# Patient Record
Sex: FEMALE | Race: WHITE | Employment: FULL TIME | URBAN - METROPOLITAN AREA
[De-identification: names, ages, dates, MRNs, and addresses within clinical notes are randomized per-mention and may not be internally consistent; named-entity substitution may affect disease eponyms.]

---
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
## 2020-01-14 ENCOUNTER — OFFICE VISIT (OUTPATIENT)
Dept: OBGYN | Facility: CLINIC | Age: 20
End: 2020-01-14
Payer: COMMERCIAL

## 2020-01-14 VITALS
HEART RATE: 94 BPM | OXYGEN SATURATION: 98 % | DIASTOLIC BLOOD PRESSURE: 69 MMHG | BODY MASS INDEX: 21.35 KG/M2 | SYSTOLIC BLOOD PRESSURE: 111 MMHG | WEIGHT: 113 LBS

## 2020-01-14 DIAGNOSIS — Z11.3 SCREENING EXAMINATION FOR STD (SEXUALLY TRANSMITTED DISEASE): ICD-10-CM

## 2020-01-14 DIAGNOSIS — Z01.419 ENCOUNTER FOR GYNECOLOGICAL EXAMINATION WITHOUT ABNORMAL FINDING: Primary | ICD-10-CM

## 2020-01-14 DIAGNOSIS — N89.8 VAGINAL DISCHARGE: ICD-10-CM

## 2020-01-14 LAB
SPECIMEN SOURCE: NORMAL
WET PREP SPEC: NORMAL

## 2020-01-14 PROCEDURE — 99395 PREV VISIT EST AGE 18-39: CPT | Performed by: OBSTETRICS & GYNECOLOGY

## 2020-01-14 PROCEDURE — 87210 SMEAR WET MOUNT SALINE/INK: CPT | Performed by: OBSTETRICS & GYNECOLOGY

## 2020-01-14 PROCEDURE — 87591 N.GONORRHOEAE DNA AMP PROB: CPT | Performed by: OBSTETRICS & GYNECOLOGY

## 2020-01-14 PROCEDURE — 87491 CHLMYD TRACH DNA AMP PROBE: CPT | Performed by: OBSTETRICS & GYNECOLOGY

## 2020-01-14 NOTE — PROGRESS NOTES
Garland is a 19 year old  female who presents for annual exam.     Menses are irregular with the Mirena.  She has intermittent discharge and vulvar discomfort, seems more common in the winter.      GYNECOLOGIC HISTORY:  Menarche:     Garland  sexually active with male partner(s) and  currently in monogamous relationship with .    History sexually transmitted infections:  STI testing offered?  Accepted  JING exposure: No  History of abnormal Pap smear: N/A  Family history of breast CA:   Family history of uterine/ovarian CA:     Family history of colon CA:     HEALTH MAINTENANCE:  Cholesterol: (No results found for: CHOL History of abnormal lipids:   Mammo:  . History of abnormal Mammo: .  Regular Self Breast Exams:   Calcium/Vitamin D intake: source:   Adequate?   TSH: (No results found for: TSH )  Pap; (No results found for: PAP )    HISTORY:  OB History   The patient has never been pregnant.     Past Medical History:   Diagnosis Date     NO ACTIVE PROBLEMS      Past Surgical History:   Procedure Laterality Date     NO HISTORY OF SURGERY       Family History   Problem Relation Age of Onset     Family History Negative Father      Family History Negative Mother      Family History Negative Maternal Grandmother      Family History Negative Maternal Grandfather      Cancer Maternal Grandfather          of lung cancer at age 35y     Family History Negative Paternal Grandmother      Family History Negative Paternal Grandfather      Family History Negative Sister      Social History     Socioeconomic History     Marital status: Single     Spouse name: None     Number of children: None     Years of education: None     Highest education level: None   Occupational History     Occupation: student     Employer: [employer]     Comment: [employer]   Social Needs     Financial resource strain: None     Food insecurity:     Worry: None     Inability: None     Transportation needs:     Medical: None     Non-medical: None   Tobacco  Use     Smoking status: Current Every Day Smoker     Types: Cigarettes     Smokeless tobacco: Never Used     Tobacco comment: non smoking home   Substance and Sexual Activity     Alcohol use: No     Drug use: No     Sexual activity: Yes     Partners: Male     Birth control/protection: I.U.D.   Lifestyle     Physical activity:     Days per week: None     Minutes per session: None     Stress: None   Relationships     Social connections:     Talks on phone: None     Gets together: None     Attends [employer] service: None     Active member of club or organization: None     Attends meetings of clubs or organizations: None     Relationship status: None     Intimate partner violence:     Fear of current or ex partner: None     Emotionally abused: None     Physically abused: None     Forced sexual activity: None   Other Topics Concern     None   Social History Narrative     None       Current Outpatient Medications:      fluconazole (DIFLUCAN) 150 MG tablet, Take 1 tablet (150 mg) by mouth every 72 hours as needed (Patient not taking: Reported on 1/14/2020), Disp: 3 tablet, Rfl: 0     levonorgestrel (MIRENA) 20 MCG/24HR IUD, 1 each (20 mcg) by Intrauterine route once for 1 dose, Disp: 1 each, Rfl: 0   No Known Allergies    Past medical, surgical, social and family history were reviewed and updated in EPIC.    ROS:   C:     NEGATIVE for fever, chills, change in weight  I:       NEGATIVE for worrisome rashes, moles or lesions  E:     NEGATIVE for vision changes or irritation  E/M: NEGATIVE for ear, mouth and throat problems  R:     NEGATIVE for significant cough or SOB  CV:   NEGATIVE for chest pain, palpitations or peripheral edema  GI:     NEGATIVE for nausea, abdominal pain, heartburn, or change in bowel habits  :   NEGATIVE for frequency, dysuria, hematuria, vaginal discharge, or irregular bleeding  M:     NEGATIVE for significant arthralgias or myalgia  N:      NEGATIVE for weakness, dizziness or paresthesias  E:       NEGATIVE for temperature intolerance, skin/hair changes  P:      NEGATIVE for changes in mood or affect.    EXAM:  /69   Pulse 94   Wt 51.3 kg (113 lb)   SpO2 98%   BMI 21.35 kg/m     BMI: Body mass index is 21.35 kg/m .  Constitutional: healthy, alert and no distress  Head: Normocephalic. No masses, lesions, tenderness or abnormalities  Neck: Neck supple. Trachea midline. No adenopathy. Thyroid symmetric, normal size.   Cardiovascular: RRR.   Respiratory: Negative.   Breast: Deferred  Gastrointestinal: Abdomen soft, non-tender, non-distended. No masses, organomegaly.  :  Vulva:  No external lesions, normal female hair distribution, no inguinal adenopathy.    Urethra:  Midline, non-tender, well supported, no discharge  Vagina:  Moist, pink, no abnormal discharge, no lesions  IUD string present.  Scant discharge, STD and wet prep sent.   Uterus:  Normal size, non-tender, freely mobile  Ovaries:  No masses appreciated, non-tender, mobile  Rectal Exam: deferred  Musculoskeletal: extremities normal  Skin: no suspicious lesions or rashes  Psychiatric: Affect appropriate, cooperative,mentation appears normal.     COUNSELING:      reports that she has been smoking cigarettes. She has never used smokeless tobacco.  Tobacco Cessation Action Plan: will consider  Body mass index is 21.35 kg/m .    FRAX Risk Assessment    ASSESSMENT:  19 year old female with satisfactory annual exam  (Z01.419) Encounter for gynecological examination without abnormal finding  (primary encounter diagnosis)  Comment:   Plan:     (Z11.3) Screening examination for STD (sexually transmitted disease)  Comment:   Plan: NEISSERIA GONORRHOEA PCR, CHLAMYDIA TRACHOMATIS        PCR

## 2020-02-16 ENCOUNTER — HEALTH MAINTENANCE LETTER (OUTPATIENT)
Age: 20
End: 2020-02-16

## 2020-08-18 ENCOUNTER — OFFICE VISIT (OUTPATIENT)
Dept: OBGYN | Facility: CLINIC | Age: 20
End: 2020-08-18
Payer: COMMERCIAL

## 2020-08-18 VITALS
WEIGHT: 116 LBS | BODY MASS INDEX: 21.92 KG/M2 | TEMPERATURE: 97.7 F | DIASTOLIC BLOOD PRESSURE: 72 MMHG | SYSTOLIC BLOOD PRESSURE: 114 MMHG | HEART RATE: 83 BPM

## 2020-08-18 DIAGNOSIS — N63.10 LUMP OF RIGHT BREAST: Primary | ICD-10-CM

## 2020-08-18 PROCEDURE — 99213 OFFICE O/P EST LOW 20 MIN: CPT | Performed by: OBSTETRICS & GYNECOLOGY

## 2020-08-18 NOTE — PROGRESS NOTES
GYN CLINIC VISIT  2020  CC: breast lump    HPI:  Garland Mullen is a 20 year old who presents with concen about right breast lump. Noticed it about a month ago, not painful.     Past Medical History:   Diagnosis Date     NO ACTIVE PROBLEMS      Past Surgical History:   Procedure Laterality Date     NO HISTORY OF SURGERY          Family History   Problem Relation Age of Onset     Family History Negative Father      Family History Negative Mother      Family History Negative Maternal Grandmother      Family History Negative Maternal Grandfather      Cancer Maternal Grandfather          of lung cancer at age 35y     Family History Negative Paternal Grandmother      Family History Negative Paternal Grandfather      Family History Negative Sister             OBJECTIVE:  Vitals:    20 1049   BP: 114/72   Pulse: 83   Temp: 97.7  F (36.5  C)   TempSrc: Oral   Weight: 52.6 kg (116 lb)     Gen: alert, oriented, no distress  Lungs: normal respiratory effort  Heart:  Normal pulses, no edema  Breast:  palpable mass:  4 cm on right upper lateral aspect, feels fibrocystic, non tender, no skin changes, does not involve areola or nipple. Normal left breast. No lymphadneopathy                ASSESSMENT:  Breast mass in right breast, suspect fibrocystic changes         PLAN:  1. Lump of right breast  Recommend diagnostic imaging  - MA Diagnostic Digital Right; Future      Emani Molina MD

## 2020-11-29 ENCOUNTER — HEALTH MAINTENANCE LETTER (OUTPATIENT)
Age: 20
End: 2020-11-29

## 2021-02-14 ENCOUNTER — HEALTH MAINTENANCE LETTER (OUTPATIENT)
Age: 21
End: 2021-02-14

## 2021-09-19 ENCOUNTER — HEALTH MAINTENANCE LETTER (OUTPATIENT)
Age: 21
End: 2021-09-19

## 2022-03-06 ENCOUNTER — HEALTH MAINTENANCE LETTER (OUTPATIENT)
Age: 22
End: 2022-03-06

## 2022-11-21 ENCOUNTER — HEALTH MAINTENANCE LETTER (OUTPATIENT)
Age: 22
End: 2022-11-21

## 2023-04-16 ENCOUNTER — HEALTH MAINTENANCE LETTER (OUTPATIENT)
Age: 23
End: 2023-04-16

## 2023-12-17 NOTE — TELEPHONE ENCOUNTER
Patient calling reporting having bleeding after intercourse. States bleeding stops afterwards. States having mild abdominal cramping. Currently denies any vaginal bleeding. Has an IUD in Place. Advised patient to be seen within 24 hours. Caller verbalized understanding. Denies further questions.      Evelio Ricci RN  [facility]       Reason for Disposition    Caller has NON-URGENT question and triager unable to answer question    Additional Information    Negative: Shock suspected (e.g., cold/pale/clammy skin, too weak to stand, low BP, rapid pulse)    Negative: Sounds like a life-threatening emergency to the triager    Negative: [1] Abdominal pain AND [2] pregnant < 20 weeks    Negative: [1] Vaginal bleeding AND [2] pregnant < 20 weeks    Negative: Vaginal discharge is main symptom    Negative: [1] SEVERE abdominal pain (e.g., excruciating) AND [2] present > 1 hour    Negative: SEVERE vaginal bleeding (i.e., soaking 2 pads or tampons per hour and present 2 or more hours)    Negative: Patient sounds very sick or weak to the triager    Negative: MODERATE vaginal bleeding (i.e., soaking 1 pad or tampon per hour and present > 6 hours)    Negative: [1] Constant abdominal pain AND [2] present > 2 hours    Negative: Caller has URGENT question and triager unable to answer question    Negative: [1] MILD abdominal pain (e.g., does not interfere with normal activities) AND [2] pain comes and goes (cramps) AND [3] present > 48 hours    Protocols used: CONTRACEPTION - IUD SYMPTOMS AND QUESTIONS-ADULT-       Go for blood tests as directed. Your doctor will do lab tests at regular visits to monitor the effects of this medicine. Please follow up with your doctor and keep your health care provider appointments